# Patient Record
Sex: MALE | Race: WHITE | URBAN - METROPOLITAN AREA
[De-identification: names, ages, dates, MRNs, and addresses within clinical notes are randomized per-mention and may not be internally consistent; named-entity substitution may affect disease eponyms.]

---

## 2018-08-11 ENCOUNTER — OFFICE VISIT (OUTPATIENT)
Dept: URGENT CARE | Facility: URGENT CARE | Age: 83
End: 2018-08-11
Payer: MEDICARE

## 2018-08-11 VITALS
DIASTOLIC BLOOD PRESSURE: 58 MMHG | WEIGHT: 191.4 LBS | TEMPERATURE: 97.1 F | SYSTOLIC BLOOD PRESSURE: 127 MMHG | HEART RATE: 72 BPM | OXYGEN SATURATION: 98 %

## 2018-08-11 DIAGNOSIS — S80.812A LEG ABRASION, LEFT, INITIAL ENCOUNTER: Primary | ICD-10-CM

## 2018-08-11 PROCEDURE — 99202 OFFICE O/P NEW SF 15 MIN: CPT | Performed by: FAMILY MEDICINE

## 2018-08-11 RX ORDER — DOXAZOSIN 4 MG/1
4 TABLET ORAL AT BEDTIME
COMMUNITY

## 2018-08-11 NOTE — MR AVS SNAPSHOT
After Visit Summary   8/11/2018    Santiago Baptiste     MRN: [id number]           Patient Information     Date Of Birth          1935        Visit Information        Provider Department      8/11/2018 8:40 AM Darren Yoon MD [facility]        Today's Diagnoses     Leg abrasion, left, initial encounter    -  1       Follow-ups after your visit        Who to contact     If you have questions or need follow up information about today's clinic visit or your schedule please contact [facility] directly at [phone].  Normal or non-critical lab and imaging results will be communicated to you by MyChart, letter or phone within 4 business days after the clinic has received the results. If you do not hear from us within 7 days, please contact the clinic through MyChart or phone. If you have a critical or abnormal lab result, we will notify you by phone as soon as possible.  Submit refill requests through [employer] or call your pharmacy and they will forward the refill request to us. Please allow 3 business days for your refill to be completed.          Additional Information About Your Visit        Care EveryWhere ID     This is your Care EveryWhere ID. This could be used by other organizations to access your [facility] medical records  [id number]        Your Vitals Were     Pulse Temperature Pulse Oximetry             72 97.1  F (36.2  C) (Oral) 98%          Blood Pressure from Last 3 Encounters:   08/11/18 127/58    Weight from Last 3 Encounters:   08/11/18 191 lb 6.4 oz (86.8 kg)              Today, you had the following     No orders found for display       Primary Care Provider Fax #    Physician No Ref-Primary [phone]       No address on file        Equal Access to Services     HELADIO CASTELLANOS : Sarina Alexander, wafrancisco luqadaha, qaybta kakendell mujica . So [facility]  [phone].    ATENCIÓN: Si habla bibi, tiene a jefferson disposición servicios gratuitos de asistencia lingüística. Timi al [phone].    We comply with applicable federal civil rights laws and [place] laws. We do not discriminate on the basis of race, color, national origin, age, disability, sex, sexual orientation, or gender identity.            Thank you!     Thank you for choosing [facility]  for your care. Our goal is always to provide you with excellent care. Hearing back from our patients is one way we can continue to improve our services. Please take a few minutes to complete the written survey that you may receive in the mail after your visit with us. Thank you!             Your Updated Medication List - Protect others around you: Learn how to safely use, store and throw away your medicines at www.disposemymeds.org.          This list is accurate as of 8/11/18  9:15 AM.  Always use your most recent med list.                   Brand Name Dispense Instructions for use Diagnosis    CARDURA 4 MG tablet   Generic drug:  doxazosin      Take 4 mg by mouth At Bedtime        OMEPRAZOLE PO

## 2018-08-11 NOTE — PROGRESS NOTES
Subjective: 1 week ago patient tripped while putting a kayak up, fell over a log and got multiple abrasions on his left anterior lower leg.  They are swollen and he has some swelling down into the ankle especially towards the end of the day.  The scab is have not changed.  He has a little bit of numbness in that area where it is swollen but really no significant pain.    Objective: There are 4 or 5 anterior shin abrasions with swelling around them but no signs of infection.  Good blood flow in the area.    Assessment and plan: Healing abrasions.  I think the swelling is to be expected and with it some secondary numbness.  I think it just needs more time, watch for infection.

## 2018-09-19 NOTE — NURSING NOTE
Chief Complaint   Patient presents with     Fall     fell over some logs, now swelling on L leg that gets worse at night, pain, redness, numbness in L leg, numbness in fingers and tingling, did take some asa last night     /58  Pulse 72  Temp 97.1  F (36.2  C) (Oral)  Wt 191 lb 6.4 oz (86.8 kg)  SpO2 98% There is no height or weight on file to calculate BMI.          Hali Lee, CMA  
42

## 2024-12-23 ENCOUNTER — TRANSFERRED RECORDS (OUTPATIENT)
Dept: HEALTH INFORMATION MANAGEMENT | Facility: CLINIC | Age: 89
End: 2024-12-23

## 2025-04-30 ENCOUNTER — TRANSFERRED RECORDS (OUTPATIENT)
Dept: HEALTH INFORMATION MANAGEMENT | Facility: CLINIC | Age: 89
End: 2025-04-30

## 2025-04-30 LAB
ALT SERPL-CCNC: 9 U/L (ref 9–46)
AST SERPL-CCNC: 17 U/L (ref 10–35)
CHOLESTEROL (EXTERNAL): 207 MG/DL
CREATININE (EXTERNAL): 1.07 MG/DL (ref 0.7–1.22)
GFR ESTIMATED (EXTERNAL): 67 ML/MIN/1.73M2
GLUCOSE (EXTERNAL): 94 MG/DL (ref 65–99)
HDLC SERPL-MCNC: 66 MG/DL
LDL CHOLESTEROL CALCULATED (EXTERNAL): 123 MG/DL
NON HDL CHOLESTEROL (EXTERNAL): 141 MG/DL
POTASSIUM (EXTERNAL): 4.9 MMOL/L (ref 3.5–5.3)
TRIGLYCERIDES (EXTERNAL): 84 MG/DL

## 2025-05-09 ENCOUNTER — TRANSFERRED RECORDS (OUTPATIENT)
Dept: HEALTH INFORMATION MANAGEMENT | Facility: CLINIC | Age: 89
End: 2025-05-09

## 2025-07-14 ENCOUNTER — MEDICAL CORRESPONDENCE (OUTPATIENT)
Dept: HEALTH INFORMATION MANAGEMENT | Facility: CLINIC | Age: 89
End: 2025-07-14
Payer: MEDICARE

## 2025-07-14 ENCOUNTER — TRANSCRIBE ORDERS (OUTPATIENT)
Dept: OTHER | Age: 89
End: 2025-07-14

## 2025-07-14 DIAGNOSIS — F03.90 ADVANCING DEMENTIA (H): Primary | ICD-10-CM
